# Patient Record
Sex: MALE | Race: WHITE | NOT HISPANIC OR LATINO | Employment: UNEMPLOYED | URBAN - METROPOLITAN AREA
[De-identification: names, ages, dates, MRNs, and addresses within clinical notes are randomized per-mention and may not be internally consistent; named-entity substitution may affect disease eponyms.]

---

## 2018-07-16 ENCOUNTER — TELEPHONE (OUTPATIENT)
Dept: NEUROLOGY | Facility: MEDICAL CENTER | Age: 53
End: 2018-07-16

## 2018-07-16 ENCOUNTER — HOSPITAL ENCOUNTER (OUTPATIENT)
Facility: MEDICAL CENTER | Age: 53
End: 2018-07-16
Attending: PSYCHIATRY & NEUROLOGY
Payer: MEDICAID

## 2018-07-16 VITALS — BODY MASS INDEX: 26.11 KG/M2 | HEIGHT: 75 IN | WEIGHT: 210 LBS

## 2018-07-17 ENCOUNTER — HOSPITAL ENCOUNTER (OUTPATIENT)
Dept: RADIOLOGY | Facility: MEDICAL CENTER | Age: 53
End: 2018-07-17

## 2018-07-17 NOTE — TELEPHONE ENCOUNTER
CC: Altered Mental Status and Left Side Weakness    Today's Date: 07/16/18    Consulting Physician: Dr. Neisha Scott       HPI:    Luan Tamayo is a 53 y.o. Male with pmhx of Debbie fever and stroke in 2012, seizure in March 2018 on gabapentin, alcohol abuse and depression, who presented to [facility] with altered mental status and left sided weakness and who is being seen in telestroke consultation. The patient awoke normally this morning and was chopping wood near his home after which he drove to a convenience store. The  noted him to be acting strangely and brought him to the ER for evaluation. He was found to be oriented x 1 upon his arrival there with left arm and leg weakness. CT Head was unremarkable. His vital signs were WNL. He improved over the course of an hour from an NIH of 7 to 0, arriving at 11:37am and being reassessed at 12:45pm. He continued to have left arm weakness, but was able to tell me that he had a similar episode in the past where his symptoms completely resolved. He thinks he may have had a seizure. He has been missing the middle of the day doses of his gabapentin and consuming at least a bottle of whiskey a day. He has been depressed because of his lack of employment and living situation. He denies any tongue biting, but gets cramping in his left arm.          ROS:   Constitutional: No fevers or chills.  Eyes: No blurry vision or eye pain.  ENT: No dysphagia or hearing loss.  Respiratory: No cough or shortness of breath.  Cardiovascular: No chest pain or palpitations.  GI: No nausea, vomiting, or diarrhea.  : No urinary incontinence or dysuria.  Musculoskeletal: No joint swelling or arthralgias.  Skin: No skin rashes.  Neuro: No headaches, dizziness, or tremors.  Endocrine: No heat or cold intolerance. No polydipsia or polyuria.  Psych: No depression or anxiety.  Heme/Lymph: No easy bruising or swollen lymph nodes.  All other systems  were reviewed and were negative.     Past Medical History:   Past Medical History:   Diagnosis Date   • CVA (cerebral infarction) 12/2012       Past Surgical History: None    Social History:   Social History     Social History   • Marital status: Single     Spouse name: N/A   • Number of children: N/A   • Years of education: N/A     Occupational History   • Not on file.     Social History Main Topics   • Smoking status: Current Every Day Smoker     Packs/day: 0.50     Types: Cigarettes   • Smokeless tobacco: Not on file   • Alcohol use Yes   • Drug use: Yes      Comment: methamphetamine   • Sexual activity: Not on file     Other Topics Concern   • Not on file     Social History Narrative   • No narrative on file   Currently drinking one bottle of whiskey a day.     Family History: No history of seizures.       Allergies:   Allergies   Allergen Reactions   • Tetanus Toxoids          Current Outpatient Prescriptions:   •  Amoxicillin-Pot Clavulanate (AUGMENTIN PO), Take  by mouth., Disp: , Rfl:       Physical Exam:   Ambulatory Vitals  /86, Pulse 70,  96% on RA.     Exam done with help of a nurse.   Constitutional: Well-developed, well-nourished, good hygiene. Appears stated age.  Cardiovascular: RRR, with no murmurs, rubs or gallops. No carotid bruits. No peripheral edema, pedal pulses intact.   Respiratory: Lungs CTA B/L, no W/R/R. Respiratory effort appears normal.    Skin: Warm, dry, intact. No rashes observed.  Eyes: Sclera anicteric. No eyelid ptosis.  Abdomen: Soft NTTP. No hepatosplenomegaly.   Extremities: No swelling or edema.  Neck: Supple, no thyromegaly.  Neurologic:   Mental Status: Awake, alert, oriented to person, place, and time.   Speech: Fluent with normal prosody.   Memory: Able to recall recent and remote events accurately.    Concentration: Attentive. Able to focus on history and follow multi-step commands.              Fund of Knowledge: Appropriate   Cranial Nerves:     CN II: PERRL. No  afferent pupillary defect.    CN III, IV, VI: Good eye closure, EOMI.     CN V: Facial sensation intact and symmetric.     CN VII: No facial asymmetry.     CN VIII: Hearing intact.     CN IX and X: Palate elevates symmetrically. Normal gag reflex.    CN XI: Symmetric shoulder shrug.     CN XII: Tongue midline.    Sensory: Intact light touch, vibration and temperature.    Coordination: No evidence of past-pointing on finger to nose testing, no dysdiadochokinesia. Heel to shin intact.             Babinski: Toes upgoing bilaterally.   Movements: No tremors observed.   Musculoskeletal:    Strength: Left arm and leg with mild weakness 4/5.    Tone: Normal bulk and tone.   Joints: No swelling in the joints of hands or knees.     Imaging:   We reviewed the outside head CT together which did not show any acute abnormalities or old areas of encephalomalacia.    Assessment/Plan:  52 yo M with h/o prior stroke and seizure in March 2018 who was found to have altered mental status and left side weakness earlier today in the context of heavy alcohol consumption and missed doses of anti-seizure medication. Head CT did not show evidence of any chronic or acute findings. It is not clear where his prior stroke was located, however, this may be a risk factor for seizures. I suspect he had a seizure earlier today and is clearing currently with a Isaiah's paralysis on his left side. I recommended he stop drinking alcohol and switch his seizure medication from gabapentin 300mg TID to Keppra 500mg BID with a cross taper. He will wean by 300mg a day of gabapentin over 3 weeks and increase 500mg day of Keppra over 2 weeks. The ER physician requested he transfer to [facility] for further evaluation, which is reasonable to rule out additional stroke.     Bernie Mahan D.O., M.P.H  MS specialist.   Board Certified Neurologist.  Neurology Clerkship Director, [employer].    Neurology,  [place]  [facility].   Tel: [phone]  Fax: [phone]

## 2018-07-17 NOTE — PROGRESS NOTES
Telemedicine from [facility], Dr. Scott, [phone].  Accepted by Dr. Mahan for Tele-Stroke consult.  Consent and facesheet obtained, reviewed and scanned to media tab.